# Patient Record
Sex: FEMALE | Race: BLACK OR AFRICAN AMERICAN | NOT HISPANIC OR LATINO | Employment: FULL TIME | URBAN - METROPOLITAN AREA
[De-identification: names, ages, dates, MRNs, and addresses within clinical notes are randomized per-mention and may not be internally consistent; named-entity substitution may affect disease eponyms.]

---

## 2023-02-18 ENCOUNTER — HOSPITAL ENCOUNTER (EMERGENCY)
Facility: CLINIC | Age: 18
Discharge: HOME | End: 2023-02-19
Attending: EMERGENCY MEDICINE | Admitting: EMERGENCY MEDICINE
Payer: COMMERCIAL

## 2023-02-18 DIAGNOSIS — M54.41 ACUTE RIGHT-SIDED LOW BACK PAIN WITH RIGHT-SIDED SCIATICA: ICD-10-CM

## 2023-02-18 PROCEDURE — 96375 TX/PRO/DX INJ NEW DRUG ADDON: CPT

## 2023-02-18 PROCEDURE — 258N000003 HC RX IP 258 OP 636: Performed by: EMERGENCY MEDICINE

## 2023-02-18 PROCEDURE — 250N000011 HC RX IP 250 OP 636: Performed by: EMERGENCY MEDICINE

## 2023-02-18 PROCEDURE — 99285 EMERGENCY DEPT VISIT HI MDM: CPT | Mod: 25

## 2023-02-18 PROCEDURE — 250N000012 HC RX MED GY IP 250 OP 636 PS 637: Performed by: EMERGENCY MEDICINE

## 2023-02-18 PROCEDURE — 96374 THER/PROPH/DIAG INJ IV PUSH: CPT

## 2023-02-18 PROCEDURE — 96361 HYDRATE IV INFUSION ADD-ON: CPT

## 2023-02-18 RX ORDER — PREDNISONE 20 MG/1
40 TABLET ORAL ONCE
Status: COMPLETED | OUTPATIENT
Start: 2023-02-18 | End: 2023-02-18

## 2023-02-18 RX ORDER — KETOROLAC TROMETHAMINE 15 MG/ML
30 INJECTION, SOLUTION INTRAMUSCULAR; INTRAVENOUS ONCE
Status: COMPLETED | OUTPATIENT
Start: 2023-02-18 | End: 2023-02-18

## 2023-02-18 RX ORDER — DIAZEPAM 10 MG/2ML
2.5 INJECTION, SOLUTION INTRAMUSCULAR; INTRAVENOUS ONCE
Status: COMPLETED | OUTPATIENT
Start: 2023-02-18 | End: 2023-02-18

## 2023-02-18 RX ADMIN — PREDNISONE 40 MG: 20 TABLET ORAL at 23:49

## 2023-02-18 RX ADMIN — DIAZEPAM 2.5 MG: 5 INJECTION, SOLUTION INTRAMUSCULAR; INTRAVENOUS at 23:49

## 2023-02-18 RX ADMIN — KETOROLAC TROMETHAMINE 30 MG: 15 INJECTION, SOLUTION INTRAMUSCULAR; INTRAVENOUS at 23:48

## 2023-02-18 RX ADMIN — SODIUM CHLORIDE 1000 ML: 9 INJECTION, SOLUTION INTRAVENOUS at 23:47

## 2023-02-18 ASSESSMENT — ENCOUNTER SYMPTOMS
NUMBNESS: 1
BACK PAIN: 1

## 2023-02-18 ASSESSMENT — ACTIVITIES OF DAILY LIVING (ADL): ADLS_ACUITY_SCORE: 35

## 2023-02-18 NOTE — Clinical Note
Dre was seen and treated in our emergency department on 2/18/2023.  She may return to school on 02/23/2023.  Seen in the emergency department    If you have any questions or concerns, please don't hesitate to call.      Alma Bardales MD

## 2023-02-19 ENCOUNTER — APPOINTMENT (OUTPATIENT)
Dept: MRI IMAGING | Facility: CLINIC | Age: 18
End: 2023-02-19
Attending: EMERGENCY MEDICINE
Payer: COMMERCIAL

## 2023-02-19 VITALS
OXYGEN SATURATION: 98 % | SYSTOLIC BLOOD PRESSURE: 108 MMHG | RESPIRATION RATE: 22 BRPM | HEIGHT: 65 IN | HEART RATE: 98 BPM | TEMPERATURE: 100.4 F | DIASTOLIC BLOOD PRESSURE: 63 MMHG | WEIGHT: 121 LBS | BODY MASS INDEX: 20.16 KG/M2

## 2023-02-19 PROCEDURE — 72148 MRI LUMBAR SPINE W/O DYE: CPT

## 2023-02-19 RX ORDER — IBUPROFEN 800 MG/1
800 TABLET, FILM COATED ORAL EVERY 8 HOURS PRN
Qty: 24 TABLET | Refills: 0 | Status: SHIPPED | OUTPATIENT
Start: 2023-02-19 | End: 2023-02-27

## 2023-02-19 RX ORDER — OXYCODONE HYDROCHLORIDE 5 MG/1
5 TABLET ORAL EVERY 6 HOURS PRN
Qty: 6 TABLET | Refills: 0 | Status: SHIPPED | OUTPATIENT
Start: 2023-02-19 | End: 2023-02-22

## 2023-02-19 RX ORDER — METHYLPREDNISOLONE 4 MG
TABLET, DOSE PACK ORAL
Qty: 21 TABLET | Refills: 0 | Status: SHIPPED | OUTPATIENT
Start: 2023-02-19

## 2023-02-19 RX ORDER — DIAZEPAM 5 MG
5 TABLET ORAL EVERY 6 HOURS PRN
Qty: 12 TABLET | Refills: 0 | Status: SHIPPED | OUTPATIENT
Start: 2023-02-19

## 2023-02-19 ASSESSMENT — ACTIVITIES OF DAILY LIVING (ADL): ADLS_ACUITY_SCORE: 35

## 2023-02-19 NOTE — DISCHARGE INSTRUCTIONS
Please take medications as prescribed for pain relief.  Continue with light stretching.  You can also try heat packs on the area.  You can buy over-the-counter lidocaine patches to put on your lower back as this may help as well.

## 2023-02-19 NOTE — ED PROVIDER NOTES
EMERGENCY DEPARTMENT ENCOUNTER      NAME: Shanna Erickson  AGE: 17 year old female  YOB: 2005  MRN: [id number]  EVALUATION DATE & TIME: 2/18/2023 10:56 PM    PCP: No primary care provider on file.    ED PROVIDER: Alma Bardales M.D.      Chief Complaint   Patient presents with     Back Pain     Leg Pain         FINAL IMPRESSION:  1. Acute right-sided low back pain with right-sided sciatica        MEDICAL DECISION MAKING:    Pertinent Labs & Imaging studies reviewed. (See chart for details)  ED Course as of 02/19/23 0145   Sat Feb 18, 2023   2325 Oral temperature recorded here at 100.4.  Tachycardic, tachypneic but likely secondary to discomfort.  Patient is coming in for evaluation of low back pain and right leg pain.  She states that she used to exercise quite frequently and she occasionally she would get back pain.  She says she stopped exercising about a year ago and has noted that she has had some tightness and pain in her back that comes and goes over the past year.  Over the past 2 weeks she has had significantly more pain on the right side of her lower back and in the midline part of her back.  She has noted some tingling to her right leg as well as well as some tenderness that extends down into her right quad.  Over the last 2 days she has had significantly more pain in her right leg and now with numbness with difficulty walking secondary to the pain.  Reports the pain originates in her lower back right around her spine.  No falls or trauma that preceded this.  She did state that yesterday she went to a different doctor who prescribed her cyclobenzaprine and gave her some stretching exercises to perform which she did yesterday.  Today the pain seems much worse.  She denies any bowel or bladder dysfunction.  She reports numbness in her inner thigh and along the entirety of her right leg.    Patient's physical exam here tearful, uncomfortable appearing.  Patient does report midline lumbar  tenderness at L2-L4 as well as significant tenderness paraspinally on the right-hand side.  She reports lack of sensation to entirety of her right leg.  She does have appropriate patellar reflexes.  She states she is unable to move her foot at the ankle or flex her hip.  Is able to flex her knee.  She has significant tenderness with any straight leg raise in her lower back.     2329   Patient here may have nerve impingement secondary to some muscle spasming.  She does have some significant spasming appreciated on physical exam however quite tender in the midline as well.  Unsure what to make of fever here today.  She is not reporting any recent illness.  She is not diabetic.  She has no risk factors for infections such as diabetes, intravenous drug use.  No recent procedures.  No recent injections.  She did attempt the exercises yesterday and this may have exacerbated some today.  Has not had imaging performed.  We will get an MRI lumbar spine.  Give medications for pain control and some steroids to see if this will help.     Patient is MRI here without any acute process.  Feeling better after medications.  Still having some pain but much more tolerable.  Plan to discharge home with medications, close follow-up with primary care.  Return for any new or worsening symptoms.    Medical Decision Making    History:    Supplemental history from: Documented in chart, if applicable    External Record(s) reviewed: Documented in chart, if applicable.    Work Up:    Chart documentation includes differential considered and any EKGs or imaging independently interpreted by provider, where specified.    In additional to work up documented, I considered the following work up: Documented in chart, if applicable.    External consultation:    Discussion of management with another provider: Documented in chart, if applicable    Complicating factors:    Care impacted by chronic illness: N/A    Care affected by social determinants of  health: N/A    Disposition considerations: Discharge. I prescribed additional prescription strength medication(s) as charted. See documentation for any additional details.        Critical care: 0 minutes excluding separately billable procedures.  Includes bedside management, time reviewing test results, review of records, discussing the case with staff, documenting the medical record and time spent with family members (or surrogate decision makers) discussing specific treatment issues.          ED COURSE:  11:17 PM I met with the patient, obtained history, performed an initial exam, and discussed options and plan for diagnostics and treatment here in the ED.  1:38 AM I rechecked and updated patient on results. We discussed the plan for discharge and the patient is agreeable. Reviewed supportive cares, symptomatic treatment, outpatient follow up, and reasons to return to the Emergency Department. Patient to be discharged by ED RN.     The importance of close follow up was discussed. We reviewed warning signs and symptoms, and I instructed Ms. Erickson to return to the emergency department immediately if she develops any new or worsening symptoms. I provided additional verbal discharge instructions. Ms. Erickson expressed understanding and agreement with this plan of care, her questions were answered, and she was discharged in stable condition.     MEDICATIONS GIVEN IN THE EMERGENCY:  Medications   diazepam (VALIUM) injection 2.5 mg (2.5 mg Intravenous Given 2/18/23 2349)   ketorolac (TORADOL) injection 30 mg (30 mg Intravenous Given 2/18/23 2348)   0.9% sodium chloride BOLUS (1,000 mLs Intravenous New Bag 2/18/23 2347)   predniSONE (DELTASONE) tablet 40 mg (40 mg Oral Given 2/18/23 2349)       NEW PRESCRIPTIONS STARTED AT TODAY'S ER VISIT:  New Prescriptions    DIAZEPAM (VALIUM) 5 MG TABLET    Take 1 tablet (5 mg) by mouth every 6 hours as needed for muscle spasms    IBUPROFEN (ADVIL/MOTRIN) 800 MG TABLET    Take 1 tablet  (800 mg) by mouth every 8 hours as needed for moderate pain (4-6)    METHYLPREDNISOLONE (MEDROL DOSEPAK) 4 MG TABLET THERAPY PACK    Follow Package Directions    OXYCODONE (ROXICODONE) 5 MG TABLET    Take 1 tablet (5 mg) by mouth every 6 hours as needed for severe pain (7-10)          =================================================================    HPI    Patient information was obtained from: Patient    Use of : N/A      Shanna Erickson is a 17 year old female with no pertinent history who presents to the ED via walk-in for the evaluation of back pain and leg pain.    Patient reports lower back pain and right leg pain. She states that she used to exercise frequently and would occasionally get back pain. She notes she stopped exercising about a year ago and reports that she has had some intermittent tightness and pain in her back over the last year. Patient reports that over the last two weeks, she has had significantly more pain on the right side of her lower back and in the midline part of her back. She also notes some tingling to right leg as well as some pain that radiates down into right quad starting from her lower back around her spine. Over the last two days, she has had significantly more pain in her right leg and now reports numbness to right leg and inner thigh and difficulties walking secondary to the pain. She states that yesterday, she did go to a different doctor who prescribed her cyclobenzaprine and gave her some stretching exercises to perform which she did yesterday. Today, patient reports that her pain worsened, which prompted her to be seen in the ED. Otherwise, she denies any recent falls or trauma, and bladder or bowel dysfunction. No other complaints at this time.    REVIEW OF SYSTEMS   Review of Systems   Gastrointestinal:        Negative for bowel changes   Genitourinary:        Negative for urinary changes   Musculoskeletal: Positive for back pain (lower) and gait problem.      "   Positive for right leg pain secondary to back pain   Neurological: Positive for numbness (right leg).        Positive for tingling to right leg   All other systems reviewed and are negative.    PAST MEDICAL HISTORY:  History reviewed. No pertinent past medical history.    PAST SURGICAL HISTORY:  History reviewed. No pertinent surgical history.    CURRENT MEDICATIONS:    No current facility-administered medications for this encounter.    Current Outpatient Medications:      diazepam (VALIUM) 5 MG tablet, Take 1 tablet (5 mg) by mouth every 6 hours as needed for muscle spasms, Disp: 12 tablet, Rfl: 0     ibuprofen (ADVIL/MOTRIN) 800 MG tablet, Take 1 tablet (800 mg) by mouth every 8 hours as needed for moderate pain (4-6), Disp: 24 tablet, Rfl: 0     methylPREDNISolone (MEDROL DOSEPAK) 4 MG tablet therapy pack, Follow Package Directions, Disp: 21 tablet, Rfl: 0     oxyCODONE (ROXICODONE) 5 MG tablet, Take 1 tablet (5 mg) by mouth every 6 hours as needed for severe pain (7-10), Disp: 6 tablet, Rfl: 0    ALLERGIES:  No Known Allergies    FAMILY HISTORY:  History reviewed. No pertinent family history.    SOCIAL HISTORY:   Social History     Socioeconomic History     Marital status: Single       PHYSICAL EXAM:    Vitals: /70   Pulse 104   Temp 100.4  F (38  C) (Temporal)   Resp 22   Ht 1.651 m (5' 5\")   Wt 54.9 kg (121 lb)   LMP 02/02/2023 (Approximate)   SpO2 99%   BMI 20.14 kg/m     General:. Alert and interactive, tearful, uncomfortable appearing.  HENT: Oropharynx without erythema or exudates. MMM.  TMs clear bilaterally.  Eyes: Pupils mid-sized and equally reactive.   Neck: Full AROM.  No midline tenderness to palpation.  Cardiovascular: Regular rate and rhythm. Peripheral pulses 2+ bilaterally.  Chest/Pulmonary: Normal work of breathing. Lung sounds clear and equal throughout, no wheezes or crackles. No chest wall tenderness or deformities.  Abdomen: Soft, nondistended. Nontender without guarding or " rebound.  Back/Spine: Patient does report midline lumbar tenderness at L2-L4 as well as significant tenderness paraspinally on the right-hand side. Significant tenderness with any straight leg raise in lower back. No CVA tenderness.  Extremities: Normal ROM of all major joints. No lower extremity edema. Appropriate patellar reflexes. Patient states she is unable to move her foot at the ankle or flex her hip although is able to flex her knee.   Skin: Warm and dry. Normal skin color.   Neuro: Speech clear. CNs grossly intact. Moves all extremities appropriately. Strength grossly intact to all extremities. Reported lack of sensation to entirety of right leg.   Psych: Normal affect/mood, cooperative, memory appropriate.     LAB:  All pertinent labs reviewed and interpreted.  Labs Ordered and Resulted from Time of ED Arrival to Time of ED Departure - No data to display    RADIOLOGY:  Lumbar spine MRI w/o contrast   Final Result   IMPRESSION:   1.  Normal distal cord.   2.  Minimal to mild degenerative changes without significant canal narrowing at any level.   3.  Minimal bilateral foraminal narrowing at L5-S1.                 I, Magaly Chavarria, am serving as a scribe to document services personally performed by Dr. Alma Bardales  based on my observation and the provider's statements to me. I, Alma Bardales MD attest that Magaly Chavarria is acting in a scribe capacity, has observed my performance of the services and has documented them in accordance with my direction.      Alma Bardales M.D.  Emergency Medicine  [facility] EMERGENCY ROOM  [address] [id number] [phone]  Dept: [phone]     Alma Bardales MD  02/19/23 0146

## 2023-02-19 NOTE — ED TRIAGE NOTES
Low back pain for 1 year, pain down right leg, pain has been severe for 24 hours and now pt reports right leg is numb.  Pt denies injury to back or leg     Triage Assessment     Row Name 02/18/23 0519       Triage Assessment (Adult)    Airway WDL WDL       Respiratory WDL    Respiratory WDL WDL       Skin Circulation/Temperature WDL    Skin Circulation/Temperature WDL WDL       Cardiac WDL    Cardiac WDL WDL       Peripheral/Neurovascular WDL    Peripheral Neurovascular WDL WDL       Cognitive/Neuro/Behavioral WDL    Cognitive/Neuro/Behavioral WDL WDL

## 2023-02-19 NOTE — ED NOTES
Reports with chronic low back pain that radiates down her right leg intermittently. Alert and oriented x 4. Very tearful on presentation and complains of severe pain with movement. PIV, imaging, medications per provider orders. Reports feeling much improved at time of discharge and is able to walk without observed distress and is able to eat pizza and pop with no nausea or other complaint. Family at bedside and offer support and assistance to patient. Stable at time of discharge and is ambulatory. States understanding regarding discharge instructions, recommended follow up, prescription medications.

## 2023-10-25 ENCOUNTER — HOSPITAL ENCOUNTER (EMERGENCY)
Facility: CLINIC | Age: 18
Discharge: HOME | End: 2023-10-25

## 2023-10-25 VITALS
OXYGEN SATURATION: 99 % | WEIGHT: 129 LBS | BODY MASS INDEX: 20.73 KG/M2 | SYSTOLIC BLOOD PRESSURE: 112 MMHG | TEMPERATURE: 97.7 F | HEART RATE: 71 BPM | HEIGHT: 66 IN | RESPIRATION RATE: 18 BRPM | DIASTOLIC BLOOD PRESSURE: 62 MMHG

## 2023-10-25 DIAGNOSIS — U07.1 COVID-19: ICD-10-CM

## 2023-10-25 LAB
FLUAV RNA SPEC QL NAA+PROBE: NEGATIVE
FLUBV RNA RESP QL NAA+PROBE: NEGATIVE
GROUP A STREP BY PCR: NOT DETECTED
RSV RNA SPEC NAA+PROBE: NEGATIVE
SARS-COV-2 RNA RESP QL NAA+PROBE: POSITIVE

## 2023-10-25 PROCEDURE — 99283 EMERGENCY DEPT VISIT LOW MDM: CPT

## 2023-10-25 PROCEDURE — 87651 STREP A DNA AMP PROBE: CPT | Performed by: EMERGENCY MEDICINE

## 2023-10-25 PROCEDURE — 87637 SARSCOV2&INF A&B&RSV AMP PRB: CPT | Performed by: EMERGENCY MEDICINE

## 2023-10-25 RX ORDER — BENZONATATE 100 MG/1
100 CAPSULE ORAL 3 TIMES DAILY PRN
Qty: 15 CAPSULE | Refills: 0 | Status: SHIPPED | OUTPATIENT
Start: 2023-10-25

## 2023-10-25 ASSESSMENT — ENCOUNTER SYMPTOMS
DIARRHEA: 0
DIAPHORESIS: 1
TROUBLE SWALLOWING: 1
NAUSEA: 0
CHILLS: 0
MYALGIAS: 1
FEVER: 0
SORE THROAT: 1
COUGH: 1
VOMITING: 0
RHINORRHEA: 1
BLOOD IN STOOL: 0
HEADACHES: 0
ABDOMINAL PAIN: 1
SHORTNESS OF BREATH: 1

## 2023-10-25 ASSESSMENT — ACTIVITIES OF DAILY LIVING (ADL): ADLS_ACUITY_SCORE: 33

## 2023-10-25 NOTE — Clinical Note
Dre was seen and treated in our emergency department on 10/25/2023.  She may return to school on 11/01/2023.  Or sooner if feeling improved without fevers or respiratory symptoms.    If you have any questions or concerns, please don't hesitate to call.      Millie Perez PA-C

## 2023-10-25 NOTE — Clinical Note
Shaina Virgen accompanied Shanna Erickson to the emergency department on 10/25/2023. They may return to work on 10/26/2023.      If you have any questions or concerns, please don't hesitate to call.      Millie Perez PA-C

## 2023-10-26 NOTE — ED TRIAGE NOTES
Patient arrives to the ER with c/o body aches, chills, cough and sore throat. Patient stated the symptoms started this AM. Took Tylenol 1 hour PTA.     Triage Assessment (Adult)       Row Name 10/25/23 9299          Triage Assessment    Airway WDL WDL        Respiratory WDL    Respiratory WDL X;rhythm/pattern     Rhythm/Pattern, Respiratory dyspnea upon exertion        Skin Circulation/Temperature WDL    Skin Circulation/Temperature WDL WDL        Cardiac WDL    Cardiac WDL WDL        Peripheral/Neurovascular WDL    Peripheral Neurovascular WDL WDL

## 2023-10-26 NOTE — ED PROVIDER NOTES
EMERGENCY DEPARTMENT ENCOUNTER      NAME: Shanna Erickson  AGE: 17 year old female  YOB: 2005  MRN: [id number]  EVALUATION DATE & TIME: No admission date for patient encounter.    PCP: System, Provider Not In    ED PROVIDER: Millie Perez PA-C      Chief Complaint   Patient presents with    Covid Concern    Flu Symptoms         FINAL IMPRESSION:  1. COVID-19          ED COURSE & MEDICAL DECISION MAKING:    10:46 PM Met with patient for initial interview. Plan for care discussed.   11:15 PM Reevaluated and updated patient. I discussed the plan for discharge with the patient, and patient is agreeable. We discussed supportive cares at home and reasons for return to the ER including new or worsening symptoms. All questions and concerns addressed. Patient to be discharged by RN.    17 year old otherwise healthy female presents to the Emergency Department for evaluation of generalized body aches, loss of taste and smell, sore throat, and dry cough requesting COVID test. Upon exam, patient is afebrile, hemodynamically stable, and in no acute distress. RRR and lungs clear to auscultation bilaterally. Differential diagnosis includes but not limited to COVID, influenza, strep, other viral syndrome. Low suspicion for pneumonia or PE, PERC negative. Based on patient's presenting symptoms, laboratories were ordered. Discussed option of CXR, which using shared decision making was deferred.    Patient declined any analgesia upon arrival. Symptoms and workup most consistent with COVID infection. Patient was made aware of the above findings. Plan to discharge patient home with prescription tessalon, strict return precautions, and close follow up with their PCP for reevaluation and ongoing management as needed. The patient was stable and well appearing upon discharge. The patient was advised to return to the ER if any new or worsening symptoms develop. The patient verbalizes understanding and agrees with the plan.  "    Medical Decision Making    History:  Supplemental history from: Documented in chart, if applicable  External Record(s) reviewed: Documented in chart, if applicable.    Work Up:  Chart documentation includes differential considered and any EKGs or imaging independently interpreted by provider, where specified.  In additional to work up documented, I considered the following work up: Documented in chart, if applicable.    External consultation:  Discussion of management with another provider: Documented in chart, if applicable    Complicating factors:  Care impacted by chronic illness: N/A  Care affected by social determinants of health: N/A    Disposition considerations: Discharge. I prescribed additional prescription strength medication(s) as charted. See documentation for any additional details.        MEDICATIONS GIVEN IN THE EMERGENCY:  Medications - No data to display    NEW PRESCRIPTIONS STARTED AT TODAY'S ER VISIT  New Prescriptions    BENZONATATE (TESSALON) 100 MG CAPSULE    Take 1 capsule (100 mg) by mouth 3 times daily as needed for cough          =================================================================    HPI    Patient information was obtained from: Patient    Use of : N/A      Shanna Erickson is a 17 year old female with no pertinent history who presents to this ED by private car for evaluation of a COVID-19 concern and influenza symptoms.    Patient reports an onset of myalgias, difficulty swallowing, shortness of breath, rhinorrhea, coughing, diaphoresis, and a sore throat which occurred this morning. She noticed she couldn't taste or smell anything past 11:00 AM today. She feels as if she's \"falling\". She took Tylenol an hour ago, prior to arrival in the ED, which didn't help. Patient started to have abdominal pain now. She doesn't have any allergies. She had COVID-19 twice in the past. No concerns for strep exposure or recent traveling. Last menstrual was on 10/6. No history of " "blood clots, acid reflux, or asthma. Patient denies congestion, nausea, vomiting, phlegm, fever, chills, diarrhea, melena, vaginal discharge, vaginal bleeding, chest pain, edema, and headaches. No other medical concerns.    REVIEW OF SYSTEMS   Review of Systems   Constitutional:  Positive for diaphoresis. Negative for chills and fever.   HENT:  Positive for rhinorrhea, sore throat and trouble swallowing. Negative for congestion.    Respiratory:  Positive for cough (no phlegm) and shortness of breath.    Cardiovascular:  Negative for chest pain and leg swelling.   Gastrointestinal:  Positive for abdominal pain. Negative for blood in stool, diarrhea, nausea and vomiting.   Genitourinary:  Negative for vaginal bleeding and vaginal discharge.   Musculoskeletal:  Positive for myalgias (\"body aches\").   Neurological:  Negative for headaches.   All other systems reviewed and are negative.       PAST MEDICAL HISTORY:  History reviewed. No pertinent past medical history.    PAST SURGICAL HISTORY:  History reviewed. No pertinent surgical history.        CURRENT MEDICATIONS:    benzonatate (TESSALON) 100 MG capsule  diazepam (VALIUM) 5 MG tablet  methylPREDNISolone (MEDROL DOSEPAK) 4 MG tablet therapy pack        ALLERGIES:  No Known Allergies    FAMILY HISTORY:  History reviewed. No pertinent family history.    SOCIAL HISTORY:   Social History     Socioeconomic History    Marital status: Single       VITALS:  /60   Pulse 71   Temp 97.7  F (36.5  C) (Oral)   Resp 18   Ht 1.676 m (5' 6\")   Wt 58.5 kg (129 lb)   LMP 10/06/2023   SpO2 95%   BMI 20.82 kg/m      PHYSICAL EXAM    Constitutional:  Alert, in no acute distress. Cooperative.  EYES: Conjunctivae clear.   HENT:  Atraumatic, normocephalic. Normal nose. Oropharynx without erythema, swelling, or exudates. Tonsils 1+ and symmetrical. Uvula midline without edema. No evidence of tonsillar or peritonsillar abscess. Moist membranes. No dental pain or periapical " swelling/fluctuance. No submandibular swelling. No trismus. No buccal edema or erythema. Tolerates secretions. No nuchal rigidity. Full ROM neck without pain. No palpable cervical lymphadenopathy. Trachea midline with normal phonation.   Respiratory:  Respirations even, unlabored, in no acute respiratory distress. Lungs clear to auscultation bilaterally without wheeze, rhonchi, or rales. No cough. Speaks in full sentences easily.  Cardiovascular:  Regular rate and rhythm, good peripheral perfusion. No peripheral edema. No chest wall tenderness.  GI: Soft, flat, non-distended. Bowel sounds normal. Mild epigastric tenderness to palpation. No guarding, rebound, or other peritoneal signs.  Musculoskeletal:  No edema. No cyanosis. Range of motion major extremities intact.    Integument: Warm, Dry. No rash to visualized skin.  Neurologic:  Alert & oriented. No focal deficits noted.  Psych: Normal mood and affect.      LAB:  All pertinent labs reviewed and interpreted.  Results for orders placed or performed during the hospital encounter of 10/25/23   Symptomatic Influenza A/B, RSV, & SARS-CoV2 PCR (COVID-19) Nasopharyngeal    Specimen: Nasopharyngeal; Swab   Result Value Ref Range    Influenza A PCR Negative Negative    Influenza B PCR Negative Negative    RSV PCR Negative Negative    SARS CoV2 PCR Positive (A) Negative   Group A Streptococcus PCR Throat Swab    Specimen: Throat; Swab   Result Value Ref Range    Group A strep by PCR Not Detected Not Detected       I, [place], am serving as a scribe to document services personally performed by Millie Perez PA-C based on my observation and the provider's statements to me. I, Millie Perez PA-C, attest that [place] is acting in a scribe capacity, has observed my performance of the services and has documented them in accordance with my direction.    Millie Perez PA-C  [facility] EMERGENCY ROOM  [address]  [place] [address]  [phone]      Millie Perez PA-C  10/25/23 2704

## 2023-10-26 NOTE — DISCHARGE INSTRUCTIONS
You were seen in the ER and tested positive for COVID.     Rest and drink plenty of fluids.  You may take ibuprofen and/or Tylenol as needed for pain - see dosing information below.  You may take Tessalon for cough and over-the-counter Mucinex for congestion.    You may use over-the-counter Cepacol lozenges, Throat Coat tea, tea with honey, salt water gargles, as needed for sore throat.     Tylenol (Acetaminophen) Discharge Instructions:  You may take 2 tablets of regular strength, over-the-counter, Tylenol (acetaminophen) every 4-6 hours as needed for pain.  Take no more than 4000 mg of Tylenol in a 24-hour period.      Avoid taking more than 1 acetaminophen-containing product at a time and be aware that many over-the-counter medications contain a combination of acetaminophen and other products.  If you are taking Tylenol in addition to a combination product please keep track of your daily acetaminophen dose to make sure you do not exceed the recommended 4000 mg.  Taking too much acetaminophen can cause permanent damage to your liver.    Ibuprofen/Naproxen Discharge Instructions:  You may take ibuprofen for pain control.  The maximum dose of (ibuprofen is 3200 mg ) in a 24-hour period.    Take this medication with food to prevent stomach irritation.  With long-term use this medication can irritate the stomach causing pain and lead to development of a stomach ulcer.  If you notice stomach pain or vomiting of coffee-ground colored vomit or blood, please be seen by a healthcare provider.  Attempt to use this medication for the shortest time possible.      Follow-up with your primary care provider for reevaluation as needed.    Return to the emergency department for any new or worsening symptoms including uncontrollable fever/chills, neck stiffness, rash, chest pain, shortness of breath, coughing up blood, or any other concerning symptoms.    Take Care!  - Millie Perez PA-C

## 2023-12-11 ENCOUNTER — APPOINTMENT (OUTPATIENT)
Dept: CT IMAGING | Facility: CLINIC | Age: 18
End: 2023-12-11
Attending: EMERGENCY MEDICINE

## 2023-12-11 ENCOUNTER — APPOINTMENT (OUTPATIENT)
Dept: ULTRASOUND IMAGING | Facility: CLINIC | Age: 18
End: 2023-12-11
Attending: EMERGENCY MEDICINE

## 2023-12-11 ENCOUNTER — HOSPITAL ENCOUNTER (EMERGENCY)
Facility: CLINIC | Age: 18
Discharge: HOME | End: 2023-12-12
Attending: EMERGENCY MEDICINE | Admitting: EMERGENCY MEDICINE

## 2023-12-11 VITALS
DIASTOLIC BLOOD PRESSURE: 66 MMHG | OXYGEN SATURATION: 94 % | WEIGHT: 119 LBS | BODY MASS INDEX: 19.13 KG/M2 | HEART RATE: 59 BPM | RESPIRATION RATE: 16 BRPM | HEIGHT: 66 IN | SYSTOLIC BLOOD PRESSURE: 103 MMHG | TEMPERATURE: 97.5 F

## 2023-12-11 DIAGNOSIS — K76.0 HEPATIC STEATOSIS: ICD-10-CM

## 2023-12-11 DIAGNOSIS — R10.13 ABDOMINAL PAIN, EPIGASTRIC: ICD-10-CM

## 2023-12-11 LAB
ALBUMIN SERPL BCG-MCNC: 4.7 G/DL (ref 3.5–5.2)
ALBUMIN UR-MCNC: 10 MG/DL
ALP SERPL-CCNC: 159 U/L (ref 40–150)
ALT SERPL W P-5'-P-CCNC: <5 U/L (ref 0–50)
ANION GAP SERPL CALCULATED.3IONS-SCNC: 12 MMOL/L (ref 7–15)
APPEARANCE UR: CLEAR
AST SERPL W P-5'-P-CCNC: 26 U/L (ref 0–45)
BASOPHILS # BLD AUTO: 0.1 10E3/UL (ref 0–0.2)
BASOPHILS NFR BLD AUTO: 1 %
BILIRUB DIRECT SERPL-MCNC: ABNORMAL MG/DL
BILIRUB SERPL-MCNC: 1.1 MG/DL
BILIRUB UR QL STRIP: NEGATIVE
BUN SERPL-MCNC: 6.2 MG/DL (ref 6–20)
CALCIUM SERPL-MCNC: 9.7 MG/DL (ref 8.6–10)
CHLORIDE SERPL-SCNC: 103 MMOL/L (ref 98–107)
COLOR UR AUTO: YELLOW
CREAT SERPL-MCNC: 0.57 MG/DL (ref 0.51–0.95)
DEPRECATED HCO3 PLAS-SCNC: 24 MMOL/L (ref 22–29)
EGFRCR SERPLBLD CKD-EPI 2021: >90 ML/MIN/1.73M2
EOSINOPHIL # BLD AUTO: 0.1 10E3/UL (ref 0–0.7)
EOSINOPHIL NFR BLD AUTO: 2 %
ERYTHROCYTE [DISTWIDTH] IN BLOOD BY AUTOMATED COUNT: 13.7 % (ref 10–15)
GLUCOSE SERPL-MCNC: 94 MG/DL (ref 70–99)
GLUCOSE UR STRIP-MCNC: NEGATIVE MG/DL
HCG SERPL QL: NEGATIVE
HCT VFR BLD AUTO: 37.2 % (ref 35–47)
HGB BLD-MCNC: 12.7 G/DL (ref 11.7–15.7)
HGB UR QL STRIP: ABNORMAL
IMM GRANULOCYTES # BLD: 0 10E3/UL
IMM GRANULOCYTES NFR BLD: 0 %
KETONES UR STRIP-MCNC: NEGATIVE MG/DL
LEUKOCYTE ESTERASE UR QL STRIP: NEGATIVE
LIPASE SERPL-CCNC: 17 U/L (ref 13–60)
LYMPHOCYTES # BLD AUTO: 2.8 10E3/UL (ref 0.8–5.3)
LYMPHOCYTES NFR BLD AUTO: 40 %
MCH RBC QN AUTO: 28.9 PG (ref 26.5–33)
MCHC RBC AUTO-ENTMCNC: 34.1 G/DL (ref 31.5–36.5)
MCV RBC AUTO: 85 FL (ref 78–100)
MONOCYTES # BLD AUTO: 0.5 10E3/UL (ref 0–1.3)
MONOCYTES NFR BLD AUTO: 7 %
MUCOUS THREADS #/AREA URNS LPF: PRESENT /LPF
NEUTROPHILS # BLD AUTO: 3.5 10E3/UL (ref 1.6–8.3)
NEUTROPHILS NFR BLD AUTO: 50 %
NITRATE UR QL: NEGATIVE
NRBC # BLD AUTO: 0 10E3/UL
NRBC BLD AUTO-RTO: 0 /100
PH UR STRIP: 5.5 [PH] (ref 5–7)
PLATELET # BLD AUTO: 378 10E3/UL (ref 150–450)
POTASSIUM SERPL-SCNC: 3.8 MMOL/L (ref 3.4–5.3)
PROT SERPL-MCNC: 7.9 G/DL (ref 6.4–8.3)
RBC # BLD AUTO: 4.39 10E6/UL (ref 3.8–5.2)
RBC URINE: 2 /HPF
SODIUM SERPL-SCNC: 139 MMOL/L (ref 135–145)
SP GR UR STRIP: 1.03 (ref 1–1.03)
SQUAMOUS EPITHELIAL: <1 /HPF
UROBILINOGEN UR STRIP-MCNC: <2 MG/DL
WBC # BLD AUTO: 7 10E3/UL (ref 4–11)
WBC URINE: <1 /HPF

## 2023-12-11 PROCEDURE — 96375 TX/PRO/DX INJ NEW DRUG ADDON: CPT

## 2023-12-11 PROCEDURE — 76705 ECHO EXAM OF ABDOMEN: CPT

## 2023-12-11 PROCEDURE — 81001 URINALYSIS AUTO W/SCOPE: CPT | Performed by: EMERGENCY MEDICINE

## 2023-12-11 PROCEDURE — 80053 COMPREHEN METABOLIC PANEL: CPT | Performed by: EMERGENCY MEDICINE

## 2023-12-11 PROCEDURE — 96361 HYDRATE IV INFUSION ADD-ON: CPT

## 2023-12-11 PROCEDURE — 74177 CT ABD & PELVIS W/CONTRAST: CPT

## 2023-12-11 PROCEDURE — 83690 ASSAY OF LIPASE: CPT | Performed by: EMERGENCY MEDICINE

## 2023-12-11 PROCEDURE — 250N000011 HC RX IP 250 OP 636: Performed by: EMERGENCY MEDICINE

## 2023-12-11 PROCEDURE — 36415 COLL VENOUS BLD VENIPUNCTURE: CPT | Performed by: EMERGENCY MEDICINE

## 2023-12-11 PROCEDURE — 85041 AUTOMATED RBC COUNT: CPT | Performed by: EMERGENCY MEDICINE

## 2023-12-11 PROCEDURE — 96374 THER/PROPH/DIAG INJ IV PUSH: CPT | Mod: 59

## 2023-12-11 PROCEDURE — 99285 EMERGENCY DEPT VISIT HI MDM: CPT | Mod: 25

## 2023-12-11 PROCEDURE — 84703 CHORIONIC GONADOTROPIN ASSAY: CPT | Performed by: EMERGENCY MEDICINE

## 2023-12-11 PROCEDURE — 258N000003 HC RX IP 258 OP 636: Performed by: EMERGENCY MEDICINE

## 2023-12-11 RX ORDER — IOPAMIDOL 755 MG/ML
100 INJECTION, SOLUTION INTRAVASCULAR ONCE
Status: COMPLETED | OUTPATIENT
Start: 2023-12-11 | End: 2023-12-11

## 2023-12-11 RX ORDER — ONDANSETRON 2 MG/ML
4 INJECTION INTRAMUSCULAR; INTRAVENOUS ONCE
Status: COMPLETED | OUTPATIENT
Start: 2023-12-11 | End: 2023-12-11

## 2023-12-11 RX ORDER — ONDANSETRON 4 MG/1
4 TABLET, ORALLY DISINTEGRATING ORAL EVERY 8 HOURS PRN
Qty: 10 TABLET | Refills: 0 | Status: SHIPPED | OUTPATIENT
Start: 2023-12-11 | End: 2023-12-14

## 2023-12-11 RX ADMIN — FAMOTIDINE 20 MG: 10 INJECTION, SOLUTION INTRAVENOUS at 22:37

## 2023-12-11 RX ADMIN — ONDANSETRON 4 MG: 2 INJECTION INTRAMUSCULAR; INTRAVENOUS at 22:37

## 2023-12-11 RX ADMIN — IOPAMIDOL 90 ML: 755 INJECTION, SOLUTION INTRAVENOUS at 23:08

## 2023-12-11 RX ADMIN — SODIUM CHLORIDE 1000 ML: 9 INJECTION, SOLUTION INTRAVENOUS at 22:38

## 2023-12-11 ASSESSMENT — ACTIVITIES OF DAILY LIVING (ADL): ADLS_ACUITY_SCORE: 35

## 2023-12-11 NOTE — LETTER
December 12, 2023      To Whom It May Concern:      Shanna Erickson was seen in our Emergency Department today, 12/11/23.  Please excuse her absence 12/11/23.    Sincerely,        Heather Tello RN

## 2023-12-12 NOTE — ED TRIAGE NOTES
For the last few weeks the pt has been vomiting post meals and drinking water. It is a yellow bile emesis. She had pain off and on in the epigastric area.      Triage Assessment (Adult)       Row Name 12/11/23 4483          Triage Assessment    Airway WDL WDL        Respiratory WDL    Respiratory WDL WDL        Skin Circulation/Temperature WDL    Skin Circulation/Temperature WDL WDL        Cardiac WDL    Cardiac WDL WDL        Peripheral/Neurovascular WDL    Peripheral Neurovascular WDL WDL        Cognitive/Neuro/Behavioral WDL    Cognitive/Neuro/Behavioral WDL WDL

## 2023-12-12 NOTE — ED PROVIDER NOTES
EMERGENCY DEPARTMENT ENCOUNTER      NAME: Shanna Erickson  AGE: 18 year old female  YOB: 2005  MRN: [id number]  EVALUATION DATE & TIME: No admission date for patient encounter.    PCP: [facility]    ED PROVIDER: Ernestine Rocha MD      Chief Complaint   Patient presents with    Abdominal Pain    Vomiting         FINAL IMPRESSION:  1. Abdominal pain, epigastric          ED COURSE & MEDICAL DECISION MAKING:    Pertinent Labs & Imaging studies reviewed. (See chart for details)  18 year old female presents to the Emergency Department for evaluation of epigastric pain that has been present for 2 weeks.  The pain is worse with eating.  She has also had associated vomiting, tactile fevers and headaches.  She has had decreased appetite though she has had normal bowel movements.  No urinary symptoms.  On exam, she has some mild upper abdominal tenderness to palpation.  Laboratory studies are within normal limits.  Right upper quadrant ultrasound is unremarkable.  Plan for CT scan of the abdomen pelvis and if unremarkable will initiate the patient on a PPI and put in referral for GI follow-up.    7:24 PM I met with the patient for an initial encounter and evaluation.  11:07 PM CT scan pending. Patient signed out to Dr. Pickard with pending CT scan. If unremarkable, the patient will be discharged home with PPI and anti-emetics and referral to follow-up with GI. I also put in a referral to establish care with primary care provider.     At the conclusion of the encounter I discussed the results of all of the tests and the disposition. The questions were answered. The patient or family acknowledged understanding and was agreeable with the care plan.         Medical Decision Making    History:  Supplemental history from: Documented in chart, if applicable  External Record(s) reviewed: Documented in chart, if applicable.    Work Up:  Chart documentation includes differential considered and any  "EKGs or imaging independently interpreted by provider, where specified.  In additional to work up documented, I considered the following work up: Documented in chart, if applicable.    External consultation:  Discussion of management with another provider: Documented in chart, if applicable    Complicating factors:  Care impacted by chronic illness: N/A  Care affected by social determinants of health: Access to Medical Care    Disposition considerations: Admission considered. Patient was signed out to the oncoming physician, disposition pending.      MEDICATIONS GIVEN IN THE EMERGENCY:  Medications   sodium chloride 0.9% BOLUS 1,000 mL (has no administration in time range)   ondansetron (ZOFRAN) injection 4 mg (has no administration in time range)   famotidine (PEPCID) injection 20 mg (has no administration in time range)       NEW PRESCRIPTIONS STARTED AT TODAY'S ER VISIT  New Prescriptions    No medications on file          =================================================================    HPI    Patient information was obtained from: Patient    Use of : N/A       Shanna Erickson is a 18 year old female with no pertinent history who presents to this ED by private car for evaluation of abdominal pain and vomiting.    Patient reports an onset of intermittent generalized abdominal pain which has been ongoing for weeks. She describes her pain as if her stomach is being \"cut\". She has been experiencing vomiting episodes every other hour. Patient endorses headaches and a fever.  He has had normal bowel movements, no urinary symptoms.  She took a Tylenol with minor relief of her headache. She doesn't have a history of abdominal surgeries. Patient denies any bowel changes. No other medical concerns are expressed at this time.     PAST MEDICAL HISTORY:  History reviewed. No pertinent past medical history.    PAST SURGICAL HISTORY:  History reviewed. No pertinent surgical history.        CURRENT MEDICATIONS:  " "  benzonatate (TESSALON) 100 MG capsule  diazepam (VALIUM) 5 MG tablet  methylPREDNISolone (MEDROL DOSEPAK) 4 MG tablet therapy pack        ALLERGIES:  No Known Allergies    FAMILY HISTORY:  History reviewed. No pertinent family history.    SOCIAL HISTORY:   Social History     Socioeconomic History    Marital status: Single     Spouse name: None    Number of children: None    Years of education: None    Highest education level: None       VITALS:  /77   Pulse 59   Temp 97.5  F (36.4  C) (Temporal)   Resp 16   Ht 1.676 m (5' 6\")   Wt 54 kg (119 lb)   LMP 10/06/2023   SpO2 94%   BMI 19.21 kg/m      PHYSICAL EXAM    Constitutional: Well developed, Well nourished, NAD  HENT: Normocephalic, Atraumatic, Bilateral external ears normal, Oropharynx normal, mucous membranes moist, Nose normal.   Neck- Normal range of motion, No tenderness, Supple, No stridor.  Eyes: PERRL, EOMI, Conjunctiva normal, No discharge.   Respiratory: Normal breath sounds, No respiratory distress  Cardiovascular: Normal heart rate, Regular rhythm  GI: Bowel sounds normal, Soft. Upper abdominal tenderness to palpation.  Musculoskeletal: No edema. Good range of motion in all major joints. No tenderness to palpation or major deformities noted.   Integument: Warm, Dry, No erythema, No rash  Neurologic: Alert & oriented x 3, Normal motor function, Normal sensory function, No focal deficits noted. Normal gait.   Psychiatric: Affect normal, Judgment normal, Mood normal.      LAB:  All pertinent labs reviewed and interpreted.  Results for orders placed or performed during the hospital encounter of 12/11/23   Abdomen US, limited (RUQ only)    Impression    IMPRESSION:    Normal right upper quadrant ultrasound.       RADIOLOGY:  Reviewed all pertinent imaging. Please see official radiology report.  Abdomen US, limited (RUQ only)   Final Result   IMPRESSION:      Normal right upper quadrant ultrasound.      CT Abdomen Pelvis w Contrast    (Results " Pending)         I, Davonte Lake, am serving as a scribe to document services personally performed by Ernestine Rocha, based on my observation and the provider's statements to me. I, Ernestine Rocha MD, attest that Davonte Lake is acting in a scribe capacity, has observed my performance of the services and has documented them in accordance with my direction.    Ernestine Rocha MD  Emergency Medicine  [facility] EMERGENCY ROOM  [address] [address]  [phone]       Ernestine Rocha MD  12/11/23 4141

## 2023-12-12 NOTE — DISCHARGE INSTRUCTIONS
See your doctor in 1 week for a recheck and also to talk about your fatty liver and get cholesterol/triglyceride testing done.

## 2023-12-12 NOTE — ED NOTES
EMERGENCY DEPARTMENT PROGRESS NOTE         ED COURSE AND MEDICAL DECISION MAKING  Patient was signed out to me by Dr. Rocha at 11:17 PM      Shanna Erickson is a 18 year old female who presents to the ED for abdominal pain and vomiting. At time of signout, pending CT Abdomen Pelvis.  Her CAT scan shows just fatty liver.  Certainly it is possible that an enlarged liver can cause discomfort but this is a little long-term or chronic condition so is unlikely the cause.  Will continue with the plan for patient to do outpatient treatment with antacids and antinausea medication as arranged by Dr. Rocha.  Patient is comfortable with the plan we discussed her fatty liver results and the need to see her primary care physician for cholesterol and triglyceride testing.  She also notes some occasional heavy ibuprofen use around the time of her periods and so she will try to cut back on that.    Medications   sodium chloride 0.9% BOLUS 1,000 mL (1,000 mLs Intravenous $New Bag 12/11/23 2238)   ondansetron (ZOFRAN) injection 4 mg (4 mg Intravenous $Given 12/11/23 2237)   famotidine (PEPCID) injection 20 mg (20 mg Intravenous $Given 12/11/23 2237)   iopamidol (ISOVUE-370) solution 100 mL (90 mLs Intravenous $Given 12/11/23 2308)     New Prescriptions    OMEPRAZOLE (PRILOSEC) 20 MG DR CAPSULE    Take 1 capsule (20 mg) by mouth daily for 30 days    ONDANSETRON (ZOFRAN ODT) 4 MG ODT TAB    Take 1 tablet (4 mg) by mouth every 8 hours as needed       LAB  Pertinent labs results reviewed   Results for orders placed or performed during the hospital encounter of 12/11/23   Abdomen US, limited (RUQ only)     Status: None    Narrative    EXAM: US ABDOMEN LIMITED  LOCATION: [facility]  DATE: 12/11/2023    INDICATION: Epigastric pain.  COMPARISON: Unavailable.  TECHNIQUE: Limited abdominal ultrasound.    FINDINGS:    GALLBLADDER: Normal. No gallstones, wall thickening, or pericholecystic fluid. Negative  sonographic Alfaro's sign.    BILE DUCTS: No biliary dilatation. The common duct measures 2 mm.    LIVER: Normal parenchyma with smooth contour. No focal mass.    RIGHT KIDNEY: No hydronephrosis.    PANCREAS: The visualized portions are normal.      Impression    IMPRESSION:    Normal right upper quadrant ultrasound.   Basic metabolic panel     Status: Normal   Result Value Ref Range    Sodium 139 135 - 145 mmol/L    Potassium 3.8 3.4 - 5.3 mmol/L    Chloride 103 98 - 107 mmol/L    Carbon Dioxide (CO2) 24 22 - 29 mmol/L    Anion Gap 12 7 - 15 mmol/L    Urea Nitrogen 6.2 6.0 - 20.0 mg/dL    Creatinine 0.57 0.51 - 0.95 mg/dL    GFR Estimate >90 >60 mL/min/1.73m2    Calcium 9.7 8.6 - 10.0 mg/dL    Glucose 94 70 - 99 mg/dL   Hepatic function panel     Status: Abnormal   Result Value Ref Range    Protein Total 7.9 6.4 - 8.3 g/dL    Albumin 4.7 3.5 - 5.2 g/dL    Bilirubin Total 1.1 <=1.2 mg/dL    Alkaline Phosphatase 159 (H) 40 - 150 U/L    AST 26 0 - 45 U/L    ALT <5 0 - 50 U/L    Bilirubin Direct     Lipase     Status: Normal   Result Value Ref Range    Lipase 17 13 - 60 U/L   HCG QUALitative pregnancy (blood)     Status: Normal   Result Value Ref Range    hCG Serum Qualitative Negative Negative   UA with Microscopic reflex to Culture     Status: Abnormal    Specimen: Urine, Midstream   Result Value Ref Range    Color Urine Yellow Colorless, Straw, Light Yellow, Yellow    Appearance Urine Clear Clear    Glucose Urine Negative Negative mg/dL    Bilirubin Urine Negative Negative    Ketones Urine Negative Negative mg/dL    Specific Gravity Urine 1.030 1.001 - 1.030    Blood Urine 0.03 mg/dL (A) Negative    pH Urine 5.5 5.0 - 7.0    Protein Albumin Urine 10 (A) Negative mg/dL    Urobilinogen Urine <2.0 <2.0 mg/dL    Nitrite Urine Negative Negative    Leukocyte Esterase Urine Negative Negative    Mucus Urine Present (A) None Seen /LPF    RBC Urine 2 <=2 /HPF    WBC Urine <1 <=5 /HPF    Squamous Epithelials Urine <1 <=1 /HPF     Narrative    Urine Culture not indicated   CBC with platelets and differential     Status: None   Result Value Ref Range    WBC Count 7.0 4.0 - 11.0 10e3/uL    RBC Count 4.39 3.80 - 5.20 10e6/uL    Hemoglobin 12.7 11.7 - 15.7 g/dL    Hematocrit 37.2 35.0 - 47.0 %    MCV 85 78 - 100 fL    MCH 28.9 26.5 - 33.0 pg    MCHC 34.1 31.5 - 36.5 g/dL    RDW 13.7 10.0 - 15.0 %    Platelet Count 378 150 - 450 10e3/uL    % Neutrophils 50 %    % Lymphocytes 40 %    % Monocytes 7 %    % Eosinophils 2 %    % Basophils 1 %    % Immature Granulocytes 0 %    NRBCs per 100 WBC 0 <1 /100    Absolute Neutrophils 3.5 1.6 - 8.3 10e3/uL    Absolute Lymphocytes 2.8 0.8 - 5.3 10e3/uL    Absolute Monocytes 0.5 0.0 - 1.3 10e3/uL    Absolute Eosinophils 0.1 0.0 - 0.7 10e3/uL    Absolute Basophils 0.1 0.0 - 0.2 10e3/uL    Absolute Immature Granulocytes 0.0 <=0.4 10e3/uL    Absolute NRBCs 0.0 10e3/uL   CBC with platelets differential     Status: None    Narrative    The following orders were created for panel order CBC with platelets differential.  Procedure                               Abnormality         Status                     ---------                               -----------         ------                     CBC with platelets and d...[[id number]]                      Final result                 Please view results for these tests on the individual orders.         RADIOLOGY    Pertinent imaging reviewed   Please see official radiology report.  Abdomen US, limited (RUQ only)   Final Result   IMPRESSION:      Normal right upper quadrant ultrasound.      CT Abdomen Pelvis w Contrast    (Results Pending)       FINAL IMPRESSION    1. Abdominal pain, epigastric        I, Magaly Chavarria, am serving as a scribe to document services personally performed by Dr. Pickard, based on my observations and the provider's statements to me.  I, Dr. Pickard, attest that Magaly Chavarria is acting in a scribe capacity, has observed my performance of the services  and has documented them in accordance with my direction.    MD Melly Sierra Nancy M, MD  12/12/23 0042

## 2024-06-30 ENCOUNTER — HOSPITAL ENCOUNTER (EMERGENCY)
Facility: CLINIC | Age: 19
Discharge: HOME | End: 2024-06-30
Admitting: PHYSICIAN ASSISTANT
Payer: COMMERCIAL

## 2024-06-30 VITALS
DIASTOLIC BLOOD PRESSURE: 63 MMHG | WEIGHT: 124 LBS | HEIGHT: 65 IN | TEMPERATURE: 97.7 F | RESPIRATION RATE: 18 BRPM | OXYGEN SATURATION: 98 % | SYSTOLIC BLOOD PRESSURE: 110 MMHG | BODY MASS INDEX: 20.66 KG/M2 | HEART RATE: 58 BPM

## 2024-06-30 DIAGNOSIS — L50.9 URTICARIA: ICD-10-CM

## 2024-06-30 PROCEDURE — 250N000013 HC RX MED GY IP 250 OP 250 PS 637: Performed by: PHYSICIAN ASSISTANT

## 2024-06-30 PROCEDURE — 250N000012 HC RX MED GY IP 250 OP 636 PS 637: Performed by: PHYSICIAN ASSISTANT

## 2024-06-30 PROCEDURE — 99284 EMERGENCY DEPT VISIT MOD MDM: CPT

## 2024-06-30 RX ORDER — DIPHENHYDRAMINE HCL 50 MG
50 CAPSULE ORAL ONCE
Status: COMPLETED | OUTPATIENT
Start: 2024-06-30 | End: 2024-06-30

## 2024-06-30 RX ORDER — LORATADINE 10 MG/1
10 TABLET ORAL DAILY
Qty: 4 TABLET | Refills: 0 | Status: SHIPPED | OUTPATIENT
Start: 2024-06-30 | End: 2024-07-04

## 2024-06-30 RX ORDER — PREDNISONE 20 MG/1
TABLET ORAL
Qty: 8 TABLET | Refills: 0 | Status: SHIPPED | OUTPATIENT
Start: 2024-06-30

## 2024-06-30 RX ORDER — EPINEPHRINE 0.3 MG/.3ML
0.3 INJECTION SUBCUTANEOUS
Qty: 2 EACH | Refills: 0 | Status: SHIPPED | OUTPATIENT
Start: 2024-06-30

## 2024-06-30 RX ORDER — RIFAPENTINE 150 MG/1
TABLET, FILM COATED ORAL
COMMUNITY
Start: 2024-06-18

## 2024-06-30 RX ORDER — PREDNISONE 20 MG/1
40 TABLET ORAL ONCE
Status: COMPLETED | OUTPATIENT
Start: 2024-06-30 | End: 2024-06-30

## 2024-06-30 RX ORDER — ISONIAZID 300 MG/1
TABLET ORAL
COMMUNITY
Start: 2024-06-18

## 2024-06-30 RX ORDER — FAMOTIDINE 20 MG/1
20 TABLET, FILM COATED ORAL ONCE
Status: COMPLETED | OUTPATIENT
Start: 2024-06-30 | End: 2024-06-30

## 2024-06-30 RX ORDER — FAMOTIDINE 20 MG/1
20 TABLET, FILM COATED ORAL 2 TIMES DAILY
Qty: 8 TABLET | Refills: 0 | Status: SHIPPED | OUTPATIENT
Start: 2024-06-30 | End: 2024-07-02

## 2024-06-30 RX ADMIN — DIPHENHYDRAMINE HYDROCHLORIDE 50 MG: 50 CAPSULE ORAL at 13:50

## 2024-06-30 RX ADMIN — FAMOTIDINE 20 MG: 20 TABLET, FILM COATED ORAL at 13:50

## 2024-06-30 RX ADMIN — PREDNISONE 40 MG: 20 TABLET ORAL at 13:50

## 2024-06-30 ASSESSMENT — ACTIVITIES OF DAILY LIVING (ADL)
ADLS_ACUITY_SCORE: 33

## 2024-06-30 ASSESSMENT — COLUMBIA-SUICIDE SEVERITY RATING SCALE - C-SSRS
1. IN THE PAST MONTH, HAVE YOU WISHED YOU WERE DEAD OR WISHED YOU COULD GO TO SLEEP AND NOT WAKE UP?: NO
6. HAVE YOU EVER DONE ANYTHING, STARTED TO DO ANYTHING, OR PREPARED TO DO ANYTHING TO END YOUR LIFE?: NO
2. HAVE YOU ACTUALLY HAD ANY THOUGHTS OF KILLING YOURSELF IN THE PAST MONTH?: NO

## 2024-06-30 NOTE — ED TRIAGE NOTES
The patient presents to the ED with c/o hives all over her body since waking up around 1000. She reports last night she had some new foods (beef pepperoni). She has never had issues with allergies before. She is c/o excessive itching. She has not taken any medication prior to arrival. She does endorse she is going on her third week of weekly latent TB therapy.     Triage Assessment (Adult)       Row Name 06/30/24 1244          Triage Assessment    Airway WDL WDL        Respiratory WDL    Respiratory WDL WDL        Skin Circulation/Temperature WDL    Skin Circulation/Temperature WDL WDL        Cardiac WDL    Cardiac WDL WDL        Peripheral/Neurovascular WDL    Peripheral Neurovascular WDL WDL        Cognitive/Neuro/Behavioral WDL    Cognitive/Neuro/Behavioral WDL WDL

## 2024-06-30 NOTE — ED PROVIDER NOTES
Emergency Department Encounter   NAME: Shanna Erickson ; AGE: 18 year old female ; YOB: 2005 ; MRN: [id number] ; PCP: Clinic, [facility]    ED PROVIDER: Edwige Munoz PA-C    Evaluation Date & Time:   No admission date for patient encounter.    CHIEF COMPLAINT:  Hives      Impression and Plan   MDM: Shanna Erickson is a 18 year old female who presents to the ED for evaluation of rash.  The patient presents to the emergency department for evaluation of rash that started yesterday evening and progressed this morning in the setting of using multiple new products before the rash started (facial foundation, facial setting spray, and body lotion) which she had only used them all once prior.  Here in the emergency department, patient has urticaria over her forehead, scalp, torso and upper thighs.  She has no known allergies or anaphylaxis history.  Posterior oropharynx is clear, no facial or tongue swelling, no GI symptoms, no wheezing or stridor, and she is hemodynamically stable.  No evidence of anaphylaxis at this time.  No sinister appearance of the rash.  We discussed treating in the emergency department with Benadryl, prednisone, and Pepcid with monitoring for symptom relief.  I suspect allergic component of her urticaria given new exposures.  Patient had significant improvement in her itching with mild resolution of her urticaria after medications.  No development of symptoms concerning for anaphylaxis.  Will place her on additional 4 days of prednisone, Claritin, and Pepcid for home, topical Benadryl cream provided for pruritic relief.  I will also provide her with a prescription for an EpiPen which she was advised to fill and keep on her person.  We reviewed indications to administer this at home as well as concerning signs and symptoms to return to the ER.  She was instructed to no longer use her skin care products.  We reviewed concerning signs and symptoms to return to the ER, importance  of follow-up, and she verbalized understanding is comfortable with the plan.  Discharged in stable condition.      Medical Decision Making  Obtained supplemental history:Supplemental history obtained?: Family Member/Significant Other  Reviewed external records: External records reviewed?: No  Care impacted by chronic illness:N/A  Care significantly affected by social determinants of health:N/A  Did you consider but not order tests?: Work up considered but not performed and documented in chart, if applicable  Did you interpret images independently?: Independent interpretation of ECG and images noted in documentation, when applicable.  Consultation discussion with other provider:Did you involve another provider (consultant, , pharmacy, etc.)?: No  Discharge. I prescribed additional prescription strength medication(s) as charted. See documentation for any additional details.    ED COURSE:  12:38 PM I met and introduced myself to the patient. I gathered initial history and performed my physical exam. We discussed plan for initial workup.   2:44 PM I rechecked the patient and discussed results, discharge, follow up, and reasons to return to the ED.     At the conclusion of the encounter I discussed the results of all the tests and the disposition. The questions were answered. The patient or family acknowledged understanding and was agreeable with the care plan.    FINAL IMPRESSION:    ICD-10-CM    1. Urticaria  L50.9             MEDICATIONS GIVEN IN THE EMERGENCY DEPARTMENT:  Medications   diphenhydrAMINE (BENADRYL) capsule 50 mg (50 mg Oral $Given 6/30/24 1350)   predniSONE (DELTASONE) tablet 40 mg (40 mg Oral $Given 6/30/24 1350)   famotidine (PEPCID) tablet 20 mg (20 mg Oral $Given 6/30/24 1350)         NEW PRESCRIPTIONS STARTED AT TODAY'S ED VISIT:  New Prescriptions    DIPHENHYDRAMINE (BENADRYL) 2 % EXTERNAL CREAM    Apply topically 3 times daily as needed for itching    EPINEPHRINE (ANY BX GENERIC EQUIV) 0.3  MG/0.3ML INJECTION 2-PACK    Inject 0.3 mLs (0.3 mg) into the muscle once as needed for anaphylaxis May repeat one time in 5-15 minutes if response to initial dose is inadequate.    FAMOTIDINE (PEPCID) 20 MG TABLET    Take 1 tablet (20 mg) by mouth 2 times daily for 4 days    LORATADINE (CLARITIN) 10 MG TABLET    Take 1 tablet (10 mg) by mouth daily for 4 days    PREDNISONE (DELTASONE) 20 MG TABLET    Take two tablets (= 40mg) each day for 4 (four) days         HPI   Use of Intrepreter: N/A     Shanna Erickson is a 18 year old female with a pertinent history of latent TB, who presents to the ED by private vehicle with mother for evaluation of rash.    Per patient, on her way to the movie theater last night, she noticed itching and rash around her knees which progressively spread throughout the evening, and this morning she had a rash over face and torso as well.  Rash is very itchy, she has never experienced similar rash in the past.  She states that she had used 3 newer products while getting ready for the movie theater including a setting spray and foundation on her face with immediate burning sensation after applying and lotion on her lower extremities (all of these products she had used 1 time previously).  No known allergies.  She did eat pizza sticks and beef at the movie theater though has had these foods previously and rash had already started prior to arriving in.  She denies any associated shortness of breath or wheezing, chest pain, belly pain, vomiting or diarrhea, facial or tongue swelling, or throat tightness.    Patient reports that she is currently on treatment for TB. She has not had any symptoms, however after coming back to the [place] through immigration's they saw something on her chest x-ray and her primary doctor recommended treatment.      REVIEW OF SYSTEMS:  Pertinent positive and negative symptoms per HPI.       Medical History     No past medical history on file.    No past surgical history on  "file.    No family history on file.         diphenhydrAMINE (BENADRYL) 2 % external cream  EPINEPHrine (ANY BX GENERIC EQUIV) 0.3 MG/0.3ML injection 2-pack  famotidine (PEPCID) 20 MG tablet  isoniazid (NYDRAZID) 300 MG tablet  loratadine (CLARITIN) 10 MG tablet  predniSONE (DELTASONE) 20 MG tablet  PRIFTIN 150 MG tablet  benzonatate (TESSALON) 100 MG capsule  diazepam (VALIUM) 5 MG tablet  methylPREDNISolone (MEDROL DOSEPAK) 4 MG tablet therapy pack          Physical Exam     First Vitals:  Patient Vitals for the past 24 hrs:   BP Temp Temp src Pulse Resp SpO2 Height Weight   06/30/24 1236 110/63 97.7  F (36.5  C) Oral 58 18 98 % 1.651 m (5' 5\") 56.2 kg (124 lb)         PHYSICAL EXAM:   Physical Exam  Vitals reviewed.   Constitutional:       General: She is not in acute distress.     Appearance: She is not toxic-appearing.   HENT:      Mouth/Throat:      Mouth: Mucous membranes are moist.      Pharynx: Oropharynx is clear.      Comments: Posterior oropharynx clear.  No lip, tongue, posterior pharynx edema.  Normal phonation.  Neck:      Comments: No stridor.  Cardiovascular:      Rate and Rhythm: Normal rate and regular rhythm.      Heart sounds: Normal heart sounds.   Pulmonary:      Effort: Pulmonary effort is normal.      Breath sounds: Normal breath sounds. No wheezing.   Abdominal:      General: Abdomen is flat. There is no distension.      Palpations: Abdomen is soft.      Tenderness: There is no abdominal tenderness.   Musculoskeletal:      Cervical back: Normal range of motion and neck supple.   Skin:     Comments: Raised erythematous wheals consistent with hives overlying forehead, scalp, anterior chest, abdomen, back, buttock, and thighs.  No involvement of arms or distal lower extremities.   Neurological:      Mental Status: She is alert. Mental status is at baseline.             Results     LAB:  All pertinent labs reviewed and interpreted  Labs Ordered and Resulted from Time of ED Arrival to Time of ED " Departure - No data to display    RADIOLOGY:  No orders to display       Edwige Munoz PA-C   Emergency Medicine   [facility] EMERGENCY ROOM       Edwige Munoz PA-C  06/30/24 2691

## 2024-06-30 NOTE — DISCHARGE INSTRUCTIONS
As we discussed, your rash is consistent with hives which I suspect is due to an allergic reaction.  Please do not use the lotion or new face products that you use last night.  I will send you home with 4 additional days of steroids and antihistamine medicines to help with the rash and itching.  I will also send you home with a prescription for topical Benadryl to use on areas that are very itchy.  A prescription for an EpiPen was also sent to the pharmacy which I would fill and carry with you in case of a severe reaction.  If it anytime you have trouble breathing or wheezing, vomiting or belly pain with your rash, facial swelling, throat tightness, please first administer your EpiPen and then call 911.  Return to the ER with any new or worsening symptoms.

## 2024-07-02 ENCOUNTER — HOSPITAL ENCOUNTER (EMERGENCY)
Facility: CLINIC | Age: 19
Discharge: HOME | End: 2024-07-02
Attending: EMERGENCY MEDICINE | Admitting: EMERGENCY MEDICINE
Payer: COMMERCIAL

## 2024-07-02 ENCOUNTER — APPOINTMENT (OUTPATIENT)
Dept: RADIOLOGY | Facility: CLINIC | Age: 19
End: 2024-07-02
Attending: EMERGENCY MEDICINE
Payer: COMMERCIAL

## 2024-07-02 VITALS
WEIGHT: 124 LBS | RESPIRATION RATE: 16 BRPM | SYSTOLIC BLOOD PRESSURE: 104 MMHG | HEART RATE: 70 BPM | DIASTOLIC BLOOD PRESSURE: 56 MMHG | BODY MASS INDEX: 20.63 KG/M2 | OXYGEN SATURATION: 99 % | TEMPERATURE: 98.8 F

## 2024-07-02 DIAGNOSIS — L50.9 HIVES: ICD-10-CM

## 2024-07-02 DIAGNOSIS — T78.40XA ALLERGIC REACTION, INITIAL ENCOUNTER: ICD-10-CM

## 2024-07-02 LAB
ALBUMIN SERPL BCG-MCNC: 4 G/DL (ref 3.5–5.2)
ALP SERPL-CCNC: 109 U/L (ref 40–150)
ALT SERPL W P-5'-P-CCNC: 9 U/L (ref 0–50)
ANION GAP SERPL CALCULATED.3IONS-SCNC: 9 MMOL/L (ref 7–15)
AST SERPL W P-5'-P-CCNC: 22 U/L (ref 0–45)
ATRIAL RATE - MUSE: 64 BPM
BASOPHILS # BLD AUTO: 0 10E3/UL (ref 0–0.2)
BASOPHILS NFR BLD AUTO: 0 %
BILIRUB SERPL-MCNC: 0.3 MG/DL
BUN SERPL-MCNC: 7 MG/DL (ref 6–20)
CALCIUM SERPL-MCNC: 9 MG/DL (ref 8.6–10)
CHLORIDE SERPL-SCNC: 104 MMOL/L (ref 98–107)
CREAT SERPL-MCNC: 0.57 MG/DL (ref 0.51–0.95)
DEPRECATED HCO3 PLAS-SCNC: 26 MMOL/L (ref 22–29)
DIASTOLIC BLOOD PRESSURE - MUSE: 71 MMHG
EGFRCR SERPLBLD CKD-EPI 2021: >90 ML/MIN/1.73M2
EOSINOPHIL # BLD AUTO: 0.1 10E3/UL (ref 0–0.7)
EOSINOPHIL NFR BLD AUTO: 1 %
ERYTHROCYTE [DISTWIDTH] IN BLOOD BY AUTOMATED COUNT: 13.4 % (ref 10–15)
GLUCOSE SERPL-MCNC: 121 MG/DL (ref 70–99)
HCG SERPL QL: NEGATIVE
HCT VFR BLD AUTO: 39.2 % (ref 35–47)
HGB BLD-MCNC: 13.3 G/DL (ref 11.7–15.7)
IMM GRANULOCYTES # BLD: 0 10E3/UL
IMM GRANULOCYTES NFR BLD: 0 %
INTERPRETATION ECG - MUSE: NORMAL
LYMPHOCYTES # BLD AUTO: 2.6 10E3/UL (ref 0.8–5.3)
LYMPHOCYTES NFR BLD AUTO: 19 %
MCH RBC QN AUTO: 28.5 PG (ref 26.5–33)
MCHC RBC AUTO-ENTMCNC: 33.9 G/DL (ref 31.5–36.5)
MCV RBC AUTO: 84 FL (ref 78–100)
MONOCYTES # BLD AUTO: 0.7 10E3/UL (ref 0–1.3)
MONOCYTES NFR BLD AUTO: 6 %
NEUTROPHILS # BLD AUTO: 9.8 10E3/UL (ref 1.6–8.3)
NEUTROPHILS NFR BLD AUTO: 74 %
NRBC # BLD AUTO: 0 10E3/UL
NRBC BLD AUTO-RTO: 0 /100
P AXIS - MUSE: -23 DEGREES
PLATELET # BLD AUTO: 386 10E3/UL (ref 150–450)
POTASSIUM SERPL-SCNC: 3.6 MMOL/L (ref 3.4–5.3)
PR INTERVAL - MUSE: 142 MS
PROT SERPL-MCNC: 6.7 G/DL (ref 6.4–8.3)
QRS DURATION - MUSE: 82 MS
QT - MUSE: 402 MS
QTC - MUSE: 414 MS
R AXIS - MUSE: 91 DEGREES
RBC # BLD AUTO: 4.66 10E6/UL (ref 3.8–5.2)
SODIUM SERPL-SCNC: 139 MMOL/L (ref 135–145)
SYSTOLIC BLOOD PRESSURE - MUSE: 105 MMHG
T AXIS - MUSE: 19 DEGREES
VENTRICULAR RATE- MUSE: 64 BPM
WBC # BLD AUTO: 13.2 10E3/UL (ref 4–11)

## 2024-07-02 PROCEDURE — 99285 EMERGENCY DEPT VISIT HI MDM: CPT | Mod: 25

## 2024-07-02 PROCEDURE — 250N000009 HC RX 250: Performed by: EMERGENCY MEDICINE

## 2024-07-02 PROCEDURE — 250N000013 HC RX MED GY IP 250 OP 250 PS 637: Performed by: EMERGENCY MEDICINE

## 2024-07-02 PROCEDURE — 84703 CHORIONIC GONADOTROPIN ASSAY: CPT | Performed by: EMERGENCY MEDICINE

## 2024-07-02 PROCEDURE — 85025 COMPLETE CBC W/AUTO DIFF WBC: CPT | Performed by: EMERGENCY MEDICINE

## 2024-07-02 PROCEDURE — 250N000012 HC RX MED GY IP 250 OP 636 PS 637: Performed by: EMERGENCY MEDICINE

## 2024-07-02 PROCEDURE — 94640 AIRWAY INHALATION TREATMENT: CPT

## 2024-07-02 PROCEDURE — 36415 COLL VENOUS BLD VENIPUNCTURE: CPT | Performed by: EMERGENCY MEDICINE

## 2024-07-02 PROCEDURE — 80053 COMPREHEN METABOLIC PANEL: CPT | Performed by: EMERGENCY MEDICINE

## 2024-07-02 PROCEDURE — 93005 ELECTROCARDIOGRAM TRACING: CPT | Performed by: EMERGENCY MEDICINE

## 2024-07-02 PROCEDURE — 999N000157 HC STATISTIC RCP TIME EA 10 MIN

## 2024-07-02 PROCEDURE — 71046 X-RAY EXAM CHEST 2 VIEWS: CPT

## 2024-07-02 RX ORDER — CETIRIZINE HYDROCHLORIDE 10 MG/1
10 TABLET ORAL ONCE
Status: COMPLETED | OUTPATIENT
Start: 2024-07-02 | End: 2024-07-02

## 2024-07-02 RX ORDER — FAMOTIDINE 10 MG
10 TABLET ORAL 2 TIMES DAILY PRN
Qty: 60 TABLET | Refills: 0 | Status: SHIPPED | OUTPATIENT
Start: 2024-07-02

## 2024-07-02 RX ORDER — IPRATROPIUM BROMIDE AND ALBUTEROL SULFATE 2.5; .5 MG/3ML; MG/3ML
3 SOLUTION RESPIRATORY (INHALATION) ONCE
Status: COMPLETED | OUTPATIENT
Start: 2024-07-02 | End: 2024-07-02

## 2024-07-02 RX ORDER — HYDROCORTISONE 25 MG/G
OINTMENT TOPICAL 2 TIMES DAILY PRN
Qty: 453 G | Refills: 1 | Status: SHIPPED | OUTPATIENT
Start: 2024-07-02

## 2024-07-02 RX ORDER — INHALER, ASSIST DEVICES
SPACER (EA) MISCELLANEOUS
Qty: 1 EACH | Refills: 0 | Status: SHIPPED | OUTPATIENT
Start: 2024-07-02

## 2024-07-02 RX ORDER — ALBUTEROL SULFATE 90 UG/1
2 AEROSOL, METERED RESPIRATORY (INHALATION) EVERY 4 HOURS PRN
Qty: 6.7 G | Refills: 0 | Status: SHIPPED | OUTPATIENT
Start: 2024-07-02

## 2024-07-02 RX ORDER — DIPHENHYDRAMINE HCL 25 MG
25 CAPSULE ORAL ONCE
Status: COMPLETED | OUTPATIENT
Start: 2024-07-02 | End: 2024-07-02

## 2024-07-02 RX ORDER — CETIRIZINE HYDROCHLORIDE 10 MG/1
10 TABLET ORAL 2 TIMES DAILY PRN
Qty: 60 TABLET | Refills: 0 | Status: SHIPPED | OUTPATIENT
Start: 2024-07-02

## 2024-07-02 RX ADMIN — IPRATROPIUM BROMIDE AND ALBUTEROL SULFATE 3 ML: .5; 3 SOLUTION RESPIRATORY (INHALATION) at 21:18

## 2024-07-02 RX ADMIN — DIPHENHYDRAMINE HYDROCHLORIDE 25 MG: 25 CAPSULE ORAL at 21:49

## 2024-07-02 RX ADMIN — DIPHENHYDRAMINE HYDROCHLORIDE 25 MG: 25 CAPSULE ORAL at 21:05

## 2024-07-02 RX ADMIN — PREDNISONE 50 MG: 20 TABLET ORAL at 21:05

## 2024-07-02 RX ADMIN — CETIRIZINE HYDROCHLORIDE 10 MG: 10 TABLET, FILM COATED ORAL at 21:05

## 2024-07-02 ASSESSMENT — ACTIVITIES OF DAILY LIVING (ADL)
ADLS_ACUITY_SCORE: 35

## 2024-07-02 ASSESSMENT — COLUMBIA-SUICIDE SEVERITY RATING SCALE - C-SSRS
2. HAVE YOU ACTUALLY HAD ANY THOUGHTS OF KILLING YOURSELF IN THE PAST MONTH?: NO
1. IN THE PAST MONTH, HAVE YOU WISHED YOU WERE DEAD OR WISHED YOU COULD GO TO SLEEP AND NOT WAKE UP?: NO
6. HAVE YOU EVER DONE ANYTHING, STARTED TO DO ANYTHING, OR PREPARED TO DO ANYTHING TO END YOUR LIFE?: NO

## 2024-07-02 NOTE — Clinical Note
Shanna Erickson was seen and treated in our emergency department on 7/2/2024.  She may return to work on 07/04/2024.       If you have any questions or concerns, please don't hesitate to call.      Martha Tobar MD

## 2024-07-02 NOTE — Clinical Note
Dre was seen and treated in our emergency department on 7/2/2024.  She may return to school on 07/04/2024.      If you have any questions or concerns, please don't hesitate to call.      aMrtha Tobar MD

## 2024-07-03 NOTE — ED TRIAGE NOTES
June 30th seen for hives and redness and allergic reaction.  She has been taking prescribed loratidine famotidine and prednisone.  States when she takes it she improves but then the s/s return.     Triage Assessment (Adult)       Row Name 07/02/24 2014          Triage Assessment    Airway WDL WDL        Respiratory WDL    Respiratory WDL WDL        Skin Circulation/Temperature WDL    Skin Circulation/Temperature WDL WDL        Cardiac WDL    Cardiac WDL WDL        Peripheral/Neurovascular WDL    Peripheral Neurovascular WDL WDL

## 2024-07-03 NOTE — DISCHARGE INSTRUCTIONS
I referred you to allergist for further testing.  Use either loratadine OR cetirizine two times a day until hives improve. Use hydrocortisone topical ointment for itching, as needed. Use sparingly on face.  Use benadryl if you do not have to work/go to school as this can be sedating.  Keep taking prednisone as written.  Keep track of any new topical lotions/makeup/hair products, foods, detergents, clothing, animal exposure, etc.

## 2024-07-03 NOTE — ED NOTES
AIDET performed, white board updated for rounding. Patient updated on plan of care. Patient's pain assessed. Call light within reach, bed in low position, side rails up. Visitor at bedside: 1

## 2024-07-03 NOTE — ED PROVIDER NOTES
EMERGENCY DEPARTMENT ENCOUNTER      NAME: Shanna Erickson  AGE: 18 year old female  YOB: 2005  MRN: [id number]  EVALUATION DATE & TIME: 7/2/2024  8:37 PM    PCP: [facility]    ED PROVIDER: Martha Tobar M.D.      Chief Complaint   Patient presents with    Allergic Reaction       FINAL IMPRESSION:  1. Allergic reaction, initial encounter    2. Hives        ED COURSE & MEDICAL DECISION MAKING:    Patient presents for allergic reaction.  1. Allergic reaction.  Hives noted without airway compromise. Steroids, Benadryl ordered.     No prior anaphylaxis, recent visit for hives 2 days ago. Trigger thought to be from makeup (Nars) but this has been discontinued. No other triggers obvious per patient.  New SOB and tightness in chest. CXR and EKG and duoneb ordered.  Increase loratadine or cetirizine to bid dosing recommended.  PO benadryl recommended.  Labs ordered due to hand cramping today. Check electrolytes for abnormalities. No blood work done 2 days ago.  No GI involvement, no airway obstruction or stridor. Doubt anaphylaxis today. EKG reviewed and stable.   CXR negative. Reexamined patient, improved SOB with duoneb. Will prescribe albuterol inhaler with spacer.  Topical hydrocortisone, PO benadryl, PO cetirizine, PO famotidine prescriptions also sent.  Follow up with allergist, referral placed.    8:44 PM I met with the patient to gather history and to perform my initial exam. I discussed the plan for care while in the Emergency Department.     Pertinent Labs & Imaging studies reviewed. (See chart for details).    Medical Decision Making  Obtained supplemental history:Supplemental history obtained?: Documented in chart  Reviewed external records: External records reviewed?: Documented in chart and Other: ED visit 6/30/24  Care impacted by chronic illness:N/A  Care significantly affected by social determinants of health:Access to Medical Care  Did you consider but not order tests?: Work  up considered but not performed and documented in chart, if applicable  Did you interpret images independently?: Independent interpretation of ECG and images noted in documentation, when applicable.  Consultation discussion with other provider:Did you involve another provider (consultant, , pharmacy, etc.)?: No  Discharge. I prescribed additional prescription strength medication(s) as charted. See documentation for any additional details.    At the conclusion of the encounter I discussed the results of all of the tests and the disposition. The questions were answered. The patient or family acknowledged understanding and was agreeable with the care plan.      CRITICAL CARE:  N/A    HPI    Patient information was obtained from: Patient, mother.    Use of : N/A.       Shanna Erickson is a 18 year old female who presents to the ED by walk in for evaluation of an allergic reaction.     The patient reports coming into the ED 2 days ago and was prescribed medications for her hives, but is coming in today because the hives are worsening. Patient states that when she takes the medications, the hives go away briefly but comes back in 2-3 hours much worse. The patient states that this morning after taking her medications at 7 AM, her hands felt like it was burning, she was short of breath, and was having chest heaviness. Patient mentioned using a Nars makeup primer for her face that made her have an allergic reaction.     Denies asthma hx, runny nose, new foods, and GI symptoms.     Per Chart Review: see above      REVIEW OF SYSTEMS  All other systems negative unless noted in HPI.    PAST MEDICAL HISTORY:  History reviewed. No pertinent past medical history.    PAST SURGICAL HISTORY:  History reviewed. No pertinent surgical history.      CURRENT MEDICATIONS:    No current facility-administered medications for this encounter.     Current Outpatient Medications   Medication Sig Dispense Refill    albuterol (PROAIR  HFA) 108 (90 Base) MCG/ACT inhaler Inhale 2 puffs into the lungs every 4 hours as needed for shortness of breath or wheezing 6.7 g 0    cetirizine (ZYRTEC) 10 MG tablet Take 1 tablet (10 mg) by mouth 2 times daily as needed (hives) 60 tablet 0    diphenhydrAMINE (BENADRYL) 50 MG tablet Take 1 tablet (50 mg) by mouth every 6 hours as needed (hives) 30 tablet 0    famotidine (PEPCID) 10 MG tablet Take 1 tablet (10 mg) by mouth 2 times daily as needed (hives) 60 tablet 0    hydrocortisone 2.5 % ointment Apply topically 2 times daily as needed for itching or rash 453 g 1    spacer (OPTICHAMBER FRANCISCO) holding chamber Use with inhaler 1 each 0    benzonatate (TESSALON) 100 MG capsule Take 1 capsule (100 mg) by mouth 3 times daily as needed for cough 15 capsule 0    diazepam (VALIUM) 5 MG tablet Take 1 tablet (5 mg) by mouth every 6 hours as needed for muscle spasms 12 tablet 0    diphenhydrAMINE (BENADRYL) 2 % external cream Apply topically 3 times daily as needed for itching 30 g 0    EPINEPHrine (ANY BX GENERIC EQUIV) 0.3 MG/0.3ML injection 2-pack Inject 0.3 mLs (0.3 mg) into the muscle once as needed for anaphylaxis May repeat one time in 5-15 minutes if response to initial dose is inadequate. 2 each 0    isoniazid (NYDRAZID) 300 MG tablet TAKE THREE TABLETS (900mg) BY MOUTH ONCE A WEEK      loratadine (CLARITIN) 10 MG tablet Take 1 tablet (10 mg) by mouth daily for 4 days 4 tablet 0    methylPREDNISolone (MEDROL DOSEPAK) 4 MG tablet therapy pack Follow Package Directions 21 tablet 0    predniSONE (DELTASONE) 20 MG tablet Take two tablets (= 40mg) each day for 4 (four) days 8 tablet 0    PRIFTIN 150 MG tablet TAKE 6 TABLETS (900MG) BY MOUTH ONE TIME WEEKLY           ALLERGIES:  No Known Allergies    FAMILY HISTORY:  History reviewed. No pertinent family history.    SOCIAL HISTORY:  Social History     Socioeconomic History    Marital status: Single     Social Determinants of Health      [facility]  [facility], [facility]    Social Connections       VITALS:  Patient Vitals for the past 24 hrs:   BP Temp Temp src Pulse Resp SpO2 Weight   07/02/24 2120 -- -- -- 75 26 -- --   07/02/24 2100 105/71 -- -- 58 15 99 % --   07/02/24 2043 107/59 -- -- 64 16 99 % --   07/02/24 2013 113/64 98.1  F (36.7  C) Oral 66 16 100 % 56.2 kg (124 lb)       PHYSICAL EXAM    VITAL SIGNS: /71   Pulse 75   Temp 98.1  F (36.7  C) (Oral)   Resp 26   Wt 56.2 kg (124 lb)   LMP 06/20/2024 (Exact Date)   SpO2 99%   BMI 20.63 kg/m    Physical Exam  Vitals and nursing note reviewed.   Constitutional:       General: She is not in acute distress.     Appearance: She is not toxic-appearing.   HENT:      Mouth/Throat:      Mouth: Mucous membranes are moist.   Eyes:      General: No scleral icterus.        Right eye: No discharge.         Left eye: No discharge.   Cardiovascular:      Rate and Rhythm: Normal rate and regular rhythm.   Pulmonary:      Effort: Pulmonary effort is normal. No respiratory distress.      Breath sounds: No stridor. Wheezing (faint scattered wheezing with good air entry) present. No rales.   Abdominal:      General: There is no distension.      Palpations: Abdomen is soft.      Tenderness: There is no abdominal tenderness.   Musculoskeletal:         General: No swelling or deformity.      Cervical back: Neck supple. No rigidity.   Skin:     General: Skin is warm and dry.      Capillary Refill: Capillary refill takes less than 2 seconds.      Findings: No bruising.      Comments: Hives diffusely, confluent on face. No mucous membrane involvement. No palm or sole involvement.   Neurological:      General: No focal deficit present.      Mental Status: She is alert and oriented to person, place, and time. Mental status is at baseline.      Comments: No slurred speech, following commands spontaneously. No facial droop.   Psychiatric:         Mood and Affect: Mood  normal.         Behavior: Behavior normal.         LABS  Labs Ordered and Resulted from Time of ED Arrival to Time of ED Departure   COMPREHENSIVE METABOLIC PANEL - Abnormal       Result Value    Sodium 139      Potassium 3.6      Carbon Dioxide (CO2) 26      Anion Gap 9      Urea Nitrogen 7.0      Creatinine 0.57      GFR Estimate >90      Calcium 9.0      Chloride 104      Glucose 121 (*)     Alkaline Phosphatase 109      AST 22      ALT 9      Protein Total 6.7      Albumin 4.0      Bilirubin Total 0.3     CBC WITH PLATELETS AND DIFFERENTIAL - Abnormal    WBC Count 13.2 (*)     RBC Count 4.66      Hemoglobin 13.3      Hematocrit 39.2      MCV 84      MCH 28.5      MCHC 33.9      RDW 13.4      Platelet Count 386      % Neutrophils 74      % Lymphocytes 19      % Monocytes 6      % Eosinophils 1      % Basophils 0      % Immature Granulocytes 0      NRBCs per 100 WBC 0      Absolute Neutrophils 9.8 (*)     Absolute Lymphocytes 2.6      Absolute Monocytes 0.7      Absolute Eosinophils 0.1      Absolute Basophils 0.0      Absolute Immature Granulocytes 0.0      Absolute NRBCs 0.0     HCG QUALITATIVE PREGNANCY - Normal    hCG Serum Qualitative Negative           RADIOLOGY  Chest XR,  PA & LAT   Final Result   IMPRESSION: Negative chest.         I have independently reviewed the above image. See radiology report for detail.      EKG:    EKG obtained at 2101 and shows sinus rhythm rate 64, Qtc 414, compared to previous EKG on NA, none available.motion artifact in III, pac noted. No MALLORY or depression T wave inverted in V1, V2, narrow QRS of 82ms present. I have independently reviewed and interpreted today's EKG, pending Cardiologist read.       PROCEDURES:  N/A      MEDICATIONS GIVEN IN THE EMERGENCY:  Medications   diphenhydrAMINE (BENADRYL) capsule 25 mg (25 mg Oral $Given 7/2/24 2105)   cetirizine (zyrTEC) tablet 10 mg (10 mg Oral $Given 7/2/24 2105)   ipratropium - albuterol 0.5 mg/2.5 mg/3 mL (DUONEB) neb solution 3  mL (3 mLs Nebulization $Given 7/2/24 2118)   predniSONE (DELTASONE) tablet 50 mg (50 mg Oral $Given 7/2/24 2105)   diphenhydrAMINE (BENADRYL) capsule 25 mg (25 mg Oral $Given 7/2/24 2149)       NEW PRESCRIPTIONS STARTED AT TODAY'S ER VISIT  New Prescriptions    ALBUTEROL (PROAIR HFA) 108 (90 BASE) MCG/ACT INHALER    Inhale 2 puffs into the lungs every 4 hours as needed for shortness of breath or wheezing    CETIRIZINE (ZYRTEC) 10 MG TABLET    Take 1 tablet (10 mg) by mouth 2 times daily as needed (hives)    DIPHENHYDRAMINE (BENADRYL) 50 MG TABLET    Take 1 tablet (50 mg) by mouth every 6 hours as needed (hives)    FAMOTIDINE (PEPCID) 10 MG TABLET    Take 1 tablet (10 mg) by mouth 2 times daily as needed (hives)    HYDROCORTISONE 2.5 % OINTMENT    Apply topically 2 times daily as needed for itching or rash    SPACER (OPTICHAMBER FRANCISCO) HOLDING CHAMBER    Use with inhaler        I, Lachelle Mcneill, am serving as a scribe to document services personally performed by Martha Tobar MD, based on my observations and the provider's statements to me.  I, Martha Tobar MD, attest that Lachelle Mcneill is acting in a scribe capacity, has observed my performance of the services and has documented them in accordance with my direction.     Martha Tobar MD  Emergency Medicine  [facility] EMERGENCY ROOM  [facility] [facility] [id number] [phone]  Dept: [phone]             Martha Tobar MD  07/02/24 6121